# Patient Record
Sex: FEMALE | Race: BLACK OR AFRICAN AMERICAN | Employment: UNEMPLOYED | URBAN - METROPOLITAN AREA
[De-identification: names, ages, dates, MRNs, and addresses within clinical notes are randomized per-mention and may not be internally consistent; named-entity substitution may affect disease eponyms.]

---

## 2018-08-14 ENCOUNTER — TELEPHONE (OUTPATIENT)
Dept: PAIN MANAGEMENT | Age: 48
End: 2018-08-14

## 2018-08-14 NOTE — LETTER
08/14/18    [facility]   Dr. Chantell Walsh, [address] [facility]   P: [phone]  F: [phone]    Re: Nova Christy 1970    To Whom it May Concern: Thank you for your referral to [facility] ([facility]. Regrettably, we did not receive all the necessary information to determine if this patient can be accepted into our office. I am sending this message to advise your office of what is needed for her to be considered as a new patient with us. Please know that the information requested is reviewed by our providers and must be accepted by one of them prior to scheduling any appointments. It is necessary to determine the need for pain management/narcotics due to stricter laws and regulations set in place by the state of [place], and all patients are required to provide this information without exception. For physician review and follow up, please include ALL the following:     · Site specific imaging (cannot be more than 11years old & must match referral diagnosis. Fibro/Neuropathy will require EMG)    The requested information should be faxed to [phone] or you may call us at [phone] to let us know that new information is available. Please be aware that imaging is required for every referral. If none is available, new imaging must be ordered by the referring physician. Without all the above information, the referral will be considered incomplete and cannot be submitted for review. We will call the patient to schedule an appointment once the referral has been received and accepted. Unfortunately, our office is not able to accommodate any expedited referral or scheduling requests. If no new information is received within 30 days from this request, the referral will be canceled and will need to be resubmitted with the information requested above to be considered for new patient scheduling.      Thank you,     Pain Management Staff
all the above information, the referral will be considered incomplete and cannot be submitted for review. We will call the patient to schedule an appointment once the referral has been received and accepted. Unfortunately, our office is not able to accommodate any expedited referral or scheduling requests. If no new information is received within 30 days from this request, the referral will be canceled and will need to be resubmitted with the information requested above to be considered for new patient scheduling.      Thank you,     Pain Management Staff

## 2018-08-22 NOTE — TELEPHONE ENCOUNTER
Received new referral for fibromyalgia but no imaging to match this.  Sent another fax to request this again as it was included on the initial request

## 2022-04-04 ENCOUNTER — APPOINTMENT (OUTPATIENT)
Dept: CT IMAGING | Age: 52
End: 2022-04-04
Payer: MEDICAID

## 2022-04-04 ENCOUNTER — HOSPITAL ENCOUNTER (EMERGENCY)
Age: 52
Discharge: HOME | End: 2022-04-04
Payer: MEDICAID

## 2022-04-04 ENCOUNTER — APPOINTMENT (OUTPATIENT)
Dept: GENERAL RADIOLOGY | Age: 52
End: 2022-04-04
Payer: MEDICAID

## 2022-04-04 VITALS
HEART RATE: 89 BPM | OXYGEN SATURATION: 94 % | DIASTOLIC BLOOD PRESSURE: 88 MMHG | TEMPERATURE: 98.9 F | SYSTOLIC BLOOD PRESSURE: 155 MMHG | RESPIRATION RATE: 18 BRPM | WEIGHT: 152 LBS

## 2022-04-04 DIAGNOSIS — F14.90 COCAINE USE: ICD-10-CM

## 2022-04-04 DIAGNOSIS — R09.89 CHEST CONGESTION: ICD-10-CM

## 2022-04-04 DIAGNOSIS — E87.6 HYPOKALEMIA: ICD-10-CM

## 2022-04-04 DIAGNOSIS — N30.00 ACUTE CYSTITIS WITHOUT HEMATURIA: Primary | ICD-10-CM

## 2022-04-04 DIAGNOSIS — F17.200 SMOKER: ICD-10-CM

## 2022-04-04 LAB
A/G RATIO: 1.2 (ref 1.1–2.2)
ALBUMIN SERPL-MCNC: 3.9 G/DL (ref 3.4–5)
ALP BLD-CCNC: 107 U/L (ref 40–129)
ALT SERPL-CCNC: 11 U/L (ref 10–40)
AMPHETAMINE SCREEN, URINE: ABNORMAL
ANION GAP SERPL CALCULATED.3IONS-SCNC: 13 MMOL/L (ref 3–16)
AST SERPL-CCNC: 22 U/L (ref 15–37)
BACTERIA: ABNORMAL /HPF
BARBITURATE SCREEN URINE: ABNORMAL
BASOPHILS ABSOLUTE: 0 K/UL (ref 0–0.2)
BASOPHILS RELATIVE PERCENT: 0.4 %
BENZODIAZEPINE SCREEN, URINE: ABNORMAL
BILIRUB SERPL-MCNC: 0.6 MG/DL (ref 0–1)
BILIRUBIN URINE: NEGATIVE
BLOOD, URINE: ABNORMAL
BUN BLDV-MCNC: 8 MG/DL (ref 7–20)
CALCIUM SERPL-MCNC: 9.4 MG/DL (ref 8.3–10.6)
CANNABINOID SCREEN URINE: ABNORMAL
CHLORIDE BLD-SCNC: 103 MMOL/L (ref 99–110)
CLARITY: CLEAR
CO2: 25 MMOL/L (ref 21–32)
COCAINE METABOLITE SCREEN URINE: POSITIVE
COLOR: YELLOW
CREAT SERPL-MCNC: 0.8 MG/DL (ref 0.6–1.1)
EKG ATRIAL RATE: 85 BPM
EKG DIAGNOSIS: NORMAL
EKG P AXIS: 77 DEGREES
EKG P-R INTERVAL: 164 MS
EKG Q-T INTERVAL: 384 MS
EKG QRS DURATION: 98 MS
EKG QTC CALCULATION (BAZETT): 456 MS
EKG R AXIS: 47 DEGREES
EKG T AXIS: 79 DEGREES
EKG VENTRICULAR RATE: 85 BPM
EOSINOPHILS ABSOLUTE: 0.1 K/UL (ref 0–0.6)
EOSINOPHILS RELATIVE PERCENT: 0.7 %
EPITHELIAL CELLS, UA: 2 /HPF (ref 0–5)
GFR AFRICAN AMERICAN: >60
GFR NON-AFRICAN AMERICAN: >60
GLUCOSE BLD-MCNC: 103 MG/DL (ref 70–99)
GLUCOSE URINE: NEGATIVE MG/DL
HCG QUALITATIVE: NEGATIVE
HCT VFR BLD CALC: 42.9 % (ref 36–48)
HEMOGLOBIN: 13.8 G/DL (ref 12–16)
HYALINE CASTS: 5 /LPF (ref 0–8)
KETONES, URINE: 15 MG/DL
LEUKOCYTE ESTERASE, URINE: NEGATIVE
LYMPHOCYTES ABSOLUTE: 1.2 K/UL (ref 1–5.1)
LYMPHOCYTES RELATIVE PERCENT: 12.2 %
Lab: ABNORMAL
MAGNESIUM: 2.4 MG/DL (ref 1.8–2.4)
MCH RBC QN AUTO: 28.7 PG (ref 26–34)
MCHC RBC AUTO-ENTMCNC: 32.2 G/DL (ref 31–36)
MCV RBC AUTO: 89.1 FL (ref 80–100)
METHADONE SCREEN, URINE: ABNORMAL
MICROSCOPIC EXAMINATION: YES
MONOCYTES ABSOLUTE: 0.8 K/UL (ref 0–1.3)
MONOCYTES RELATIVE PERCENT: 8.5 %
NEUTROPHILS ABSOLUTE: 7.5 K/UL (ref 1.7–7.7)
NEUTROPHILS RELATIVE PERCENT: 78.2 %
NITRITE, URINE: POSITIVE
OPIATE SCREEN URINE: ABNORMAL
OXYCODONE URINE: ABNORMAL
PDW BLD-RTO: 16.2 % (ref 12.4–15.4)
PH UA: 6
PH UA: 6 (ref 5–8)
PHENCYCLIDINE SCREEN URINE: ABNORMAL
PLATELET # BLD: 228 K/UL (ref 135–450)
PMV BLD AUTO: 8.2 FL (ref 5–10.5)
POTASSIUM REFLEX MAGNESIUM: 2.9 MMOL/L (ref 3.5–5.1)
PROPOXYPHENE SCREEN: ABNORMAL
PROTEIN UA: 30 MG/DL
RAPID INFLUENZA  B AGN: NEGATIVE
RAPID INFLUENZA A AGN: NEGATIVE
RBC # BLD: 4.82 M/UL (ref 4–5.2)
RBC UA: 6 /HPF (ref 0–4)
SARS-COV-2, NAAT: NOT DETECTED
SODIUM BLD-SCNC: 141 MMOL/L (ref 136–145)
SPECIFIC GRAVITY UA: 1.01 (ref 1–1.03)
TOTAL PROTEIN: 7.1 G/DL (ref 6.4–8.2)
URINE REFLEX TO CULTURE: YES
URINE TYPE: ABNORMAL
UROBILINOGEN, URINE: >=8 E.U./DL
WBC # BLD: 9.6 K/UL (ref 4–11)
WBC UA: 46 /HPF (ref 0–5)

## 2022-04-04 PROCEDURE — 83735 ASSAY OF MAGNESIUM: CPT

## 2022-04-04 PROCEDURE — 99284 EMERGENCY DEPT VISIT MOD MDM: CPT

## 2022-04-04 PROCEDURE — 80053 COMPREHEN METABOLIC PANEL: CPT

## 2022-04-04 PROCEDURE — 87086 URINE CULTURE/COLONY COUNT: CPT

## 2022-04-04 PROCEDURE — 80307 DRUG TEST PRSMV CHEM ANLYZR: CPT

## 2022-04-04 PROCEDURE — 6360000004 HC RX CONTRAST MEDICATION: Performed by: NURSE PRACTITIONER

## 2022-04-04 PROCEDURE — 85025 COMPLETE CBC W/AUTO DIFF WBC: CPT

## 2022-04-04 PROCEDURE — 6360000002 HC RX W HCPCS: Performed by: NURSE PRACTITIONER

## 2022-04-04 PROCEDURE — 74177 CT ABD & PELVIS W/CONTRAST: CPT

## 2022-04-04 PROCEDURE — 71046 X-RAY EXAM CHEST 2 VIEWS: CPT

## 2022-04-04 PROCEDURE — 36415 COLL VENOUS BLD VENIPUNCTURE: CPT

## 2022-04-04 PROCEDURE — 96365 THER/PROPH/DIAG IV INF INIT: CPT

## 2022-04-04 PROCEDURE — 87088 URINE BACTERIA CULTURE: CPT

## 2022-04-04 PROCEDURE — 93005 ELECTROCARDIOGRAM TRACING: CPT | Performed by: NURSE PRACTITIONER

## 2022-04-04 PROCEDURE — 87635 SARS-COV-2 COVID-19 AMP PRB: CPT

## 2022-04-04 PROCEDURE — 87186 SC STD MICRODIL/AGAR DIL: CPT

## 2022-04-04 PROCEDURE — 6370000000 HC RX 637 (ALT 250 FOR IP): Performed by: NURSE PRACTITIONER

## 2022-04-04 PROCEDURE — 93010 ELECTROCARDIOGRAM REPORT: CPT | Performed by: INTERNAL MEDICINE

## 2022-04-04 PROCEDURE — 87804 INFLUENZA ASSAY W/OPTIC: CPT

## 2022-04-04 PROCEDURE — 96375 TX/PRO/DX INJ NEW DRUG ADDON: CPT

## 2022-04-04 PROCEDURE — 84703 CHORIONIC GONADOTROPIN ASSAY: CPT

## 2022-04-04 PROCEDURE — 81001 URINALYSIS AUTO W/SCOPE: CPT

## 2022-04-04 RX ORDER — QUETIAPINE FUMARATE 25 MG/1
25 TABLET, FILM COATED ORAL 2 TIMES DAILY
COMMUNITY

## 2022-04-04 RX ORDER — ONDANSETRON 4 MG/1
4 TABLET, ORALLY DISINTEGRATING ORAL EVERY 8 HOURS PRN
Qty: 20 TABLET | Refills: 0 | Status: SHIPPED | OUTPATIENT
Start: 2022-04-04

## 2022-04-04 RX ORDER — PREDNISONE 10 MG/1
TABLET ORAL
Qty: 44 TABLET | Refills: 0 | Status: SHIPPED | OUTPATIENT
Start: 2022-04-04 | End: 2022-04-14

## 2022-04-04 RX ORDER — TRAZODONE HYDROCHLORIDE 100 MG/1
100 TABLET ORAL NIGHTLY
COMMUNITY

## 2022-04-04 RX ORDER — ALBUTEROL SULFATE 90 UG/1
2 AEROSOL, METERED RESPIRATORY (INHALATION) 4 TIMES DAILY PRN
Qty: 18 G | Refills: 0 | Status: SHIPPED | OUTPATIENT
Start: 2022-04-04

## 2022-04-04 RX ORDER — BENZONATATE 100 MG/1
100 CAPSULE ORAL 3 TIMES DAILY PRN
Qty: 21 CAPSULE | Refills: 0 | Status: SHIPPED | OUTPATIENT
Start: 2022-04-04 | End: 2022-04-11

## 2022-04-04 RX ORDER — POTASSIUM CHLORIDE 20 MEQ/1
40 TABLET, EXTENDED RELEASE ORAL ONCE
Status: COMPLETED | OUTPATIENT
Start: 2022-04-04 | End: 2022-04-04

## 2022-04-04 RX ORDER — PREDNISONE 20 MG/1
TABLET ORAL
Status: DISCONTINUED
Start: 2022-04-04 | End: 2022-04-04 | Stop reason: HOSPADM

## 2022-04-04 RX ORDER — ACETAMINOPHEN 500 MG
1000 TABLET ORAL ONCE
Status: COMPLETED | OUTPATIENT
Start: 2022-04-04 | End: 2022-04-04

## 2022-04-04 RX ORDER — POTASSIUM CHLORIDE 750 MG/1
10 TABLET, EXTENDED RELEASE ORAL 2 TIMES DAILY
Qty: 6 TABLET | Refills: 0 | Status: SHIPPED | OUTPATIENT
Start: 2022-04-04 | End: 2022-04-07

## 2022-04-04 RX ORDER — POTASSIUM CHLORIDE 7.45 MG/ML
10 INJECTION INTRAVENOUS ONCE
Status: COMPLETED | OUTPATIENT
Start: 2022-04-04 | End: 2022-04-04

## 2022-04-04 RX ORDER — CIPROFLOXACIN 500 MG/1
500 TABLET, FILM COATED ORAL 2 TIMES DAILY
Qty: 14 TABLET | Refills: 0 | Status: SHIPPED | OUTPATIENT
Start: 2022-04-04 | End: 2022-04-11

## 2022-04-04 RX ORDER — PREDNISONE 20 MG/1
60 TABLET ORAL ONCE
Status: COMPLETED | OUTPATIENT
Start: 2022-04-04 | End: 2022-04-04

## 2022-04-04 RX ADMIN — POTASSIUM CHLORIDE 40 MEQ: 1500 TABLET, EXTENDED RELEASE ORAL at 15:22

## 2022-04-04 RX ADMIN — PREDNISONE 60 MG: 20 TABLET ORAL at 13:47

## 2022-04-04 RX ADMIN — ACETAMINOPHEN 1000 MG: 500 TABLET ORAL at 13:47

## 2022-04-04 RX ADMIN — IOPAMIDOL 75 ML: 755 INJECTION, SOLUTION INTRAVENOUS at 14:33

## 2022-04-04 RX ADMIN — Medication 1000 MG: at 18:15

## 2022-04-04 RX ADMIN — POTASSIUM CHLORIDE 10 MEQ: 7.46 INJECTION, SOLUTION INTRAVENOUS at 15:23

## 2022-04-04 ASSESSMENT — ENCOUNTER SYMPTOMS
COUGH: 1
DIARRHEA: 0
NAUSEA: 0
BACK PAIN: 1
VOMITING: 0
ABDOMINAL PAIN: 1
SHORTNESS OF BREATH: 1
WHEEZING: 0
COLOR CHANGE: 0

## 2022-04-04 ASSESSMENT — PAIN SCALES - GENERAL: PAINLEVEL_OUTOF10: 2

## 2022-04-04 NOTE — ED NOTES
Patient ambulated to bathroom with steady gait for urine sample.      Chidi Keller RN  04/04/22 4149

## 2022-04-04 NOTE — ED NOTES
Patient unable to provide urine stating \"I peed but it didn't go in the hat. \" Patient refusing cath and was given water and states that she will provide urine shortly.      Radha Fyre RN  04/04/22 7947

## 2022-04-04 NOTE — ED PROVIDER NOTES
**ADVANCED PRACTICE PROVIDER, I HAVE EVALUATED THIS PATIENT**        [address] [facility]      Pt Name: Vida Hawthorne  ENS:[id number]  Danegfxochitl 1970  Date of evaluation: 4/4/2022  Provider: KERON Patten CNP      Chief Complaint:    Chief Complaint   Patient presents with    Abdominal Pain     [facility] from home d/t abd pain that radiates towards her back x 1 week    Shortness of Breath     per EMS, O2 86% RA upon arrival.          Nursing Notes, Past Medical Hx, Past Surgical Hx, Social Hx, Allergies, and Family Hx were all reviewed and agreed with or any disagreements were addressed in the HPI.    HPI: (Location, Duration, Timing, Severity, Quality, Assoc Sx, Context, Modifying factors)    Chief Complaint of cough, congestion,     This is a  47 y.o. female who presents emergency department with shortness of breath and coughing, patient states she is been coughing so hard she could not catch her breath started to having abdominal pain and back pain. Patient states when EMS arrived she was 86% on room air, patient is complaining of chest tightness now wrapping around to her abdomen and lower back. She reports having a cough and congestion off and on the past week however is gotten worse since last night. She reports some nausea but no vomiting or diarrhea. No urinary symptoms. She admits to being a smoker, smokes a pack a day. She complains of chest tightness from all the coughing that is wrapping around to her back, she rates that pain a 2/10 states is a tight sensation. She does have a history of fibromyalgia. She denies taking any medicine for the pain. She states that she called 911 today because she was coughing so hard she could not breathe. She denies any additional complaints, no additional medications, no additional aggravating relieving factors.   Patient presents awake, alert however she has wheezing and rhonchi bilaterally and in mild acute respiratory distress    PastMedical/Surgical History:      Diagnosis Date    Asthma     Bipolar 1 disorder (HCC)     Fibromyalgia     Hypertension      History reviewed. No pertinent surgical history. Medications:  Discharge Medication List as of 4/4/2022  6:51 PM      CONTINUE these medications which have NOT CHANGED    Details   QUEtiapine (SEROQUEL) 25 MG tablet Take 25 mg by mouth 2 times dailyHistorical Med      traZODone (DESYREL) 100 MG tablet Take 100 mg by mouth nightlyHistorical Med      topiramate (TOPAMAX) 200 MG tablet Take 200 mg by mouth 2 times dailyHistorical Med               Review of Systems:  (2-9 systems needed)  Review of Systems   Constitutional: Positive for chills and fever. Tactile fever and chills before she called the ambulance however she was coughing so hard she could not breathe. She was unaware if she had a fever because she did not take her temperature however, upon ED arrival her temperature was 101 degrees. HENT: Negative for congestion. Respiratory: Positive for cough and shortness of breath. Negative for wheezing. Patient complains of  shortness of breath and coughing, patient states she is been coughing so hard she could not catch her breath started to having abdominal pain and back pain. Patient states when EMS arrived she was 86% on room air, patient is complaining of chest tightness now wrapping around to her abdomen and lower back. She reports having a cough and congestion off and on the past week however is gotten worse since last night. Cardiovascular: Negative for chest pain. Gastrointestinal: Positive for abdominal pain. Negative for diarrhea, nausea and vomiting. She is been coughing so hard now she is having abdominal cramping that is going and wrapping around to her back. However she has some slight nausea but no vomiting or diarrhea.    Genitourinary: Negative for difficulty urinating, dysuria, frequency and hematuria. Musculoskeletal: Positive for back pain. Skin: Negative for color change. Neurological: Negative for weakness, numbness and headaches. \"Positives and Pertinent negatives as per HPI\"    Physical Exam:  Physical Exam  Vitals and nursing note reviewed. Constitutional:       Appearance: She is well-developed. She is not diaphoretic. HENT:      Head: Normocephalic. Right Ear: External ear normal.      Left Ear: External ear normal.   Eyes:      General: No scleral icterus. Right eye: No discharge. Left eye: No discharge. Cardiovascular:      Rate and Rhythm: Normal rate. Comments: Normal S1 and 2, Peripheral pulses 2+, no edema observed  Pulmonary:      Effort: Pulmonary effort is normal. No respiratory distress. Comments: Airway patent with symmetric rise and fall of chest, slightly tachypneic breathing 22 breaths/min however she is able to talk in sentences. Lungs are clear anteriorly however she has inspiratory expiratory wheezing posteriorly, saturations are 92 to 94% on room air. Abdominal:      Palpations: Abdomen is soft. Comments: Abdomen is soft nondistended. Bowel sounds are hypoactive, she has tenderness on both sides of the left and right lateral side of her abdomen that wraparound to her back. Musculoskeletal:         General: Normal range of motion. Cervical back: Normal range of motion and neck supple. Skin:     General: Skin is warm. Capillary Refill: Capillary refill takes less than 2 seconds. Coloration: Skin is not pale. Neurological:      Mental Status: She is alert and oriented to person, place, and time. GCS: GCS eye subscore is 4. GCS verbal subscore is 5. GCS motor subscore is 6.    Psychiatric:         Behavior: Behavior normal.         MEDICAL DECISION MAKING    Vitals:    Vitals:    04/04/22 1233 04/04/22 1245 04/04/22 1300 04/04/22 1628   BP: (!) 148/93 (!) 154/91 (!) 150/91 (!) 155/88 Pulse: 94   89   Resp: 22   18   Temp: 101.2 °F (38.4 °C)   98.9 °F (37.2 °C)   TempSrc: Oral   Oral   SpO2: 92% 93%  94%   Weight: 152 lb (68.9 kg)          LABS:  Labs Reviewed   CULTURE, URINE - Abnormal; Notable for the following components:       Result Value    Organism Escherichia coli (*)     All other components within normal limits    Narrative:     ORDER#: [id number]                          ORDERED BY: GAVINO ATKINSON  SOURCE: Urine Clean Catch                  COLLECTED:  04/04/22 16:05  ANTIBIOTICS AT YAW.:                      RECEIVED :  04/05/22 04:22   CBC WITH AUTO DIFFERENTIAL - Abnormal; Notable for the following components:    RDW 16.2 (*)     All other components within normal limits   COMPREHENSIVE METABOLIC PANEL W/ REFLEX TO MG FOR LOW K - Abnormal; Notable for the following components:    Potassium reflex Magnesium 2.9 (*)     Glucose 103 (*)     All other components within normal limits    Narrative:     [phone],  Chemistry results called to and read back by scar sharma, 04/04/2022  14:12, by [facility] - Abnormal; Notable for the following components:    Ketones, Urine 15 (*)     Blood, Urine SMALL (*)     Protein, UA 30 (*)     Urobilinogen, Urine >=8.0 (*)     Nitrite, Urine POSITIVE (*)     All other components within normal limits   URINE DRUG SCREEN - Abnormal; Notable for the following components:    Cocaine Metabolite Screen, Urine POSITIVE (*)     All other components within normal limits   MICROSCOPIC URINALYSIS - Abnormal; Notable for the following components:    Bacteria, UA 4+ (*)     WBC, UA 46 (*)     RBC, UA 6 (*)     All other components within normal limits   RAPID INFLUENZA A/B ANTIGENS   COVID-19, RAPID   MAGNESIUM    Narrative:     CALL  [facility] tel. [phone],  Chemistry results called to and read back by scar sharma, 04/04/2022  14:12, by DAVID   HCG, SERUM, QUALITATIVE        Remainder of labs reviewed and were negative at this time or not returned at the time of this note. RADIOLOGY:   Non-plain film images such as CT, Ultrasound and MRI are read by the radiologist. Ember MCCALL APRN - CNP have directly visualized the radiologic plain film image(s) with the below findings:      Interpretation per the Radiologist below, if available at the time of this note:    CT ABDOMEN PELVIS W IV CONTRAST Additional Contrast? None   Final Result   1. Questionable acute left pyelonephritis; correlate with urinalysis. XR CHEST (2 VW)   Final Result      1. No acute cardiopulmonary abnormality. MEDICAL DECISION MAKING / ED COURSE:    Because of high probability of sudden clinical deterioration of the patient's condition and risk of further deterioration, critical care time required my full attention to the patient's condition; which included chart data review, documentation, medication ordering, reviewing the patient's old records, reevaluation patient's cardiac, pulmonary and neurological status. Reevaluation of vital signs. Consultations with ED attending and admitting physician. Ordering, interpreting reviewing diagnostic testing. Therefore a critical care time was 22 minutes of direct attention to the patient's condition did not include time spent on procedures.     PROCEDURES:   Procedures    None    Patient was given:  Medications   predniSONE (DELTASONE) tablet 60 mg (60 mg Oral Given 4/4/22 1347)   acetaminophen (TYLENOL) tablet 1,000 mg (1,000 mg Oral Given 4/4/22 1347)   potassium chloride 10 mEq/100 mL IVPB (Peripheral Line) (0 mEq IntraVENous Stopped 4/4/22 1632)   potassium chloride (KLOR-CON M) extended release tablet 40 mEq (40 mEq Oral Given 4/4/22 1522)   iopamidol (ISOVUE-370) 76 % injection 75 mL (75 mLs IntraVENous Given 4/4/22 1433)   cefTRIAXone (ROCEPHIN) 1000 mg in sterile water 10 mL IV syringe (1,000 mg IntraVENous Given 4/4/22 1815)       Patient complains of  shortness of breath and coughing, patient states she is been coughing so hard she could not catch her breath started to having abdominal pain and back pain. Patient states when EMS arrived she was 86% on room air, patient is complaining of chest tightness now wrapping around to her abdomen and lower back. She reports having a cough and congestion off and on the past week however is gotten worse since last night. Patient appears to be hypervigilant upon ED arrival, she is talking extremely fast and actively coughing during doing this. After evaluation and examination patient IV access, blood work, chest x-ray, EKG, steroids, rapid flu, Covid testing and CT scan the abdomen were ordered. On ED arrival, I gave her Tylenol for her fever. CBC shows no acute sepsis or anemia. Metabolic panel shows hypokalemia with a potassium of 2.9, patient was ordered IV and oral potassium. Otherwise electrolytes and liver functions are normal.  CT the abdomen shows questionable acute left pyelonephritis, urinalysis is positive for UTI with nitrites, I did give her a dose of Rocephin, urine will be sent for culturing. Chest x-ray shows no acute cardiopulmonary findings, no pneumothorax and heart size is normal.  EKG shows sinus rhythm rate of 85 bpm, no acute ST elevation, please see attending physician documentation for EKG interpretation note. Urine drug screen is positive for cocaine, upon reevaluation her vital signs are stable, breathing has improved, I spoke with her at length about being admitted versus going home, patient would rather go home as I do believe a lot of this is related to COPD exacerbation secondary to her smoking, I can treat this accordingly however, also treat her with antibiotics for her cystitis. I did speak with her at length about using cocaine, patient states that she has not used in a couple days. She was educated about cessation of use of this.   At this time no concerns for sepsis, respiratory failure, or other emergent etiology that requires admission to the hospital.    Therefore, shared medical decision was made to the patient myself we agreed she could be discharged with outpatient follow-up. Patient was educated to stop smoking and using cocaine. Start Cipro, Zofran, albuterol inhaler, prednisone and Tessalon Perles along with a couple days of potassium as prescribed. Follow-up with PCP in 2 days for reevaluation return to the ER for worsening or concerning symptoms. The patient tolerated their visit well. I evaluated the patient. The physician was available for consultation as needed. The patient and / or the family were informed of the results of any tests, a time was given to answer questions, a plan was proposed and they agreed with plan. Patient verbalized understanding of discharge instructions and the patient was discharged in the department in stable condition     CLINICAL IMPRESSION:  1. Acute cystitis without hematuria    2. Chest congestion    3. Smoker    4. Hypokalemia    5.  Cocaine use        DISPOSITION Decision To Discharge 04/04/2022 06:22:17 PM      PATIENT REFERRED TO:    you have been given a family doctor referral, follow-up in the next 2 to 3 days for reevaluation        [facility] Emergency Department  [address]  Go to   If symptoms worsen    [address]  Pre-Services  [phone]          DISCHARGE MEDICATIONS:  Discharge Medication List as of 4/4/2022  6:51 PM      START taking these medications    Details   ciprofloxacin (CIPRO) 500 MG tablet Take 1 tablet by mouth 2 times daily for 7 days, Disp-14 tablet, R-0Print      ondansetron (ZOFRAN ODT) 4 MG disintegrating tablet Take 1 tablet by mouth every 8 hours as needed for Nausea, Disp-20 tablet, R-0Print      albuterol sulfate HFA (VENTOLIN HFA) 108 (90 Base) MCG/ACT inhaler Inhale 2 puffs into the lungs 4 times daily as needed for Wheezing, Disp-18 g, R-0Print      benzonatate (TESSALON PERLES) 100 MG capsule Take 1 capsule by mouth 3 times daily as needed for Cough, Disp-21 capsule, R-0Print      predniSONE (DELTASONE) 10 MG tablet 60 mg po x 5 days then   40 mg po x 2 days then  20 mg po x 2 days then  10 mg po x 2 days total of 11 days, Disp-44 tablet, R-0Print      potassium chloride (KLOR-CON M) 10 MEQ extended release tablet Take 1 tablet by mouth 2 times daily for 3 days, Disp-6 tablet, R-0Print             DISCONTINUED MEDICATIONS:  Discharge Medication List as of 4/4/2022  6:51 PM                 (Please note the MDM and HPI sections of this note were completed with a voice recognition program.  Efforts were made to edit the dictations but occasionally words are mis-transcribed.)    Electronically signed, KERON Mccall CNP,          KERON Mccall CNP  04/05/22 1535

## 2022-04-06 LAB
ORGANISM: ABNORMAL
URINE CULTURE, ROUTINE: ABNORMAL